# Patient Record
Sex: FEMALE | URBAN - METROPOLITAN AREA
[De-identification: names, ages, dates, MRNs, and addresses within clinical notes are randomized per-mention and may not be internally consistent; named-entity substitution may affect disease eponyms.]

---

## 2019-01-17 ENCOUNTER — TELEPHONE (OUTPATIENT)
Dept: FAMILY MEDICINE CLINIC | Facility: CLINIC | Age: 64
End: 2019-01-17

## 2019-01-17 NOTE — TELEPHONE ENCOUNTER
Please check on this patient's medical record in old system  This may be Dalia Alvarez? I need to know what medication she is on for her blood pressure  If you look this up and see that she is on a possible diuretic she must hold this for 2 days and drink fluids while she has the cold  And give a call back tomorrow morning with her blood pressure readings    Otherwise send me the list and I will contact back

## 2019-01-17 NOTE — TELEPHONE ENCOUNTER
Bp  was 96/60 pulse 111 at 11am   She does have a cold  114/75  105 pulse this am at 8am befor taking her bp meds  Is concerend about the low bp   Please advise asap